# Patient Record
Sex: FEMALE | URBAN - METROPOLITAN AREA
[De-identification: names, ages, dates, MRNs, and addresses within clinical notes are randomized per-mention and may not be internally consistent; named-entity substitution may affect disease eponyms.]

---

## 2017-12-08 ENCOUNTER — ALLIED HEALTH/NURSE VISIT (OUTPATIENT)
Dept: NURSING | Facility: CLINIC | Age: 57
End: 2017-12-08

## 2017-12-08 VITALS
WEIGHT: 145 LBS | SYSTOLIC BLOOD PRESSURE: 166 MMHG | HEART RATE: 80 BPM | HEIGHT: 60 IN | DIASTOLIC BLOOD PRESSURE: 86 MMHG | BODY MASS INDEX: 28.47 KG/M2

## 2017-12-08 DIAGNOSIS — Z00.6 EXAMINATION OF PARTICIPANT IN CLINICAL TRIAL: Primary | ICD-10-CM

## 2017-12-08 DIAGNOSIS — I10 HYPERTENSION GOAL BP (BLOOD PRESSURE) < 140/90: ICD-10-CM

## 2017-12-08 PROCEDURE — 99207 ZZC NO CHARGE NURSE ONLY: CPT

## 2017-12-08 NOTE — PROGRESS NOTES
The following section is to be completed by [facility] Pharmacist:      PGEN study visit 1 : HTN Uncontrolled on 1 BP medication Class   Study location: [place]   12/8/2017     This research participant has responded to study outreach efforts and has expressed interest in PGEN study.  she has self reported that she satisfies the following inclusion and exclusion criteria:    Inclusion criteria:  1) New diagnosis of high blood pressure but not yet on blood pressure medication or   2) Existing hypertension not controlled with one CLASS of blood pressure medication and  3) Age between 30 and 80 and  4) BMI between 19-50      Today's bmi is Body mass index is 28.32 kg/(m^2).    Exclusion Criteria:    1) All patients taking more than 1 class of hypertensive medications  2) Heart disease  (Coronary artery disease)  3) Chronic kidney disease  4) Vascular disease  5) High blood pressure due to other underlying condition (i.e. Secondary HTN)    BP screening readings by automatic cuff were: 188/114 and 192/110    Is screening BP reading today above the following parameters? Yes  >140/90 for ages 30-59 and all patients with diabetes   >150/90 for age >60    Pt's readings in the lobby were high, although the diastolic on the second reading would be within in study parameters. We had her rest for 5-10 minutes and decided to recheck manually. She stated it has never been this high and was stressed when she first arrived.     Smoking: Occasionally, perhaps 2 per day  Alcohol consumption none  Exercise5-7 times/week; walks daily  Following a DASH diet currently ?Yes    Update  Medications, and Allergies: Done Pt not currently on any medication. Was on lisinopril in the past.      Current Outpatient Prescriptions on File Prior to Visit:  LISINOPRIL 5 MG PO TABS 1 TABLET TWICE DAILY   VENLAFAXINE  MG PO TB24 2 TABS ONCE DAY   BUPROPION  MG PO TB12 1 TABLET TWICE DAILY   SEROQUEL 100 MG PO TABS 1 TABLET TWICE DAILY  "  OMEPRAZOLE 20 MG PO CPDR 1 CAPSULE DAILY   TRAZODONE  MG PO TABS 1 TABLET 3 TIMES DAILY     No current facility-administered medications on file prior to visit.     Last Basic Metabolic Panel:  No results found for: NA   No results found for: POTASSIUM  No results found for: CHLORIDE  No results found for: MANDO  No results found for: CO2  No results found for: BUN  No results found for: CR  No results found for: GLC     A baseline potassium, creatinine, BUN, GFR has not been done within past 12 months (at [facility] or accessible via Care Everywhere)      Ht 5' (1.524 m)  Wt 145 lb (65.8 kg)  BMI 28.32 kg/m2      Is the manual confirmatory BP reading as recorded in the vitals section today also high? Yes  >140/90 for ages 30-59 and diabetics  >150/90 for age >60    Pt's initial manual BP in the room was 178/94. After another 5 minutes of rest, it came down to 166/86, which is more in line with the values the patient reports getting.    Please complete \"PGEN Hypertension Study\" flow sheet : Done    Which arm is this potential patient being considered for enrollment into ? HTN Uncontrolled on 1 BP medication Class     Patient has signed the consent form and HIPPA form: Done.     Cheek swabs (genetic profile test) was obtained per protocol and will be provided to clinic lab today : Done    Bar code associated with genetic test kit is : [id number]     Results will be available to the research team in 2-3 weeks and patient will be advised of the treatment plan, based on the randomization process, at that time (or at 4 weeks for patients in the uncontrolled HTN group)      Our research team will reach out to patient to schedule a follow up visit.    Patient was counseled regarding the lifestyle changes (listed below)  to help with BP management Done  Lifestyle changes that can help control high blood pressure:  Even though PGEN is a study to test effectiveness of genetically guided medications for managing high blood " pressure, there are several things you can do to ensure your blood pressure stays in good control:    Maintain a healthy weight (BMI<26). A modest amount of weight loss can be helpful    Limit salt intake to under 2400mg daily    Follow the DASH diet (lean meats, low salt, whole grains, lots of fruits/vegies)    Stay active, try to get in 30 minutes of exercise daily.    Manage your daily stress.    Do not smoke cigarettes (or cut back)    Limit alcohol (2 drinks/day for men, 1 drink/day for women)  Was AVS  provided to patient with the relevant <dot>PGENPI... dot phrase pulled into patient instructions: yes    Patient was given an opportunity to ask questions.  Patient verbalized understanding of this plan and is agreeable to continuing with this research study : yes    Moses HumphreyD      G provider notes and plan:  Based on the above and chart review the patient is noted to have:HTN Uncontrolled on 1 BP medication Class      Patient is suitable for enrollment into the PGEN study: yes    I have added the diagnosis of hypertension to the problem list with the appropriate JNC8 target blood pressure:  yes    Leslie Silverman MD

## 2017-12-08 NOTE — PATIENT INSTRUCTIONS
[facility] Hypertension Study   Visit 1     Thank you for your interest in the [facility] hypertension research study.    At the visit today we swabbed your cheeks to obtain a genetic test that will be used to guide your blood pressure treatment.      In the coming days you will be contacted by a research team member to schedule your next office visit.  After it is scheduled, be sure to let the research coordinator know as soon as possible if you cannot make it so that the visit can be rescheduled for you.     As a participant in the [facility] for Hypertension Study you will be asked to use a blood pressure cuff for the first 30 days of your study participation to see how your blood pressure is when you are off of your blood pressure medication. You are being asked to wear the blood pressure cuff to measure your blood pressure twice daily. Please ensure that measurements are taken in the morning after a five minute resting period and before exercising, eating, or consuming alcohol or caffeine products. Wait at least 30 minutes after showering before taking measurement.    You will be asked to enter these daily blood pressure measurements onto a paper log. At the end of each week, you will need to enter your blood pressure values into the online blood pressure log provided by [employer] via  email link. If you are completing the surveys on paper, please return the paper blood pressure log to your clinic during your second study visit.     **IMPORTANT** If during these 30 days you record a systolic reading of 170  or higher or a diastolic reading of 110 or higher for one of your two blood pressure readings, wait 2-3 minutes and take a third blood pressure measurement. We ask that you then call the [employer] 24/7 triage system at [phone] if you record two high blood pressure readings. In addition, please contact the [employer] triage system if you experience any symptoms that may be related to hypertension.     If you record blood  pressure at this level and also experience symptoms such  as chest pain, shortness of breath, numbness/weakness, change in vision or difficulty speaking call 911     Lifestyle changes that can help control high blood pressure:  Even though PGEN is a study to test effectiveness of genetically guided medications for managing high blood pressure, there are several things you can do to ensure your blood pressure stays in good control:    Maintain a healthy weight (BMI<26). A modest amount of weight loss can be helpful    Limit salt intake to under 2400mg daily    Follow the DASH diet (lean meats, low salt, whole grains, lots of fruits/vegies)    Stay active, try to get in 30 minutes of exercise daily.    Manage your daily stress.    Do not smoke cigarettes (or cut back)    Limit alcohol (2 drinks/day for men, 1 drink/day for women)     In four weeks, your hypertension medications will be prescribed via phone or through [employer].     If you have any questions or concerns about the study please contact the research coordinator at [phone]. If your phone number, email or address changes please alert the research coordinator.    In the meantime, we ask that you complete the online surveys emailed out to  you, if completing online, or mailed out to you, if completing paper surveys,  before your next visit.

## 2017-12-08 NOTE — MR AVS SNAPSHOT
After Visit Summary   12/8/2017    Amanda Chance    MRN: [id number]           Patient Information     Date Of Birth          1960        Visit Information        Provider Department      12/8/2017 1:30 PM Provider, [facility]        Today's Diagnoses     Examination of participant in clinical trial    -  1      Care Instructions    [facility] Hypertension Study   Visit 1     Thank you for your interest in the [facility] hypertension research study.    At the visit today we swabbed your cheeks to obtain a genetic test that will be used to guide your blood pressure treatment.      In the coming days you will be contacted by a research team member to schedule your next office visit.  After it is scheduled, be sure to let the research coordinator know as soon as possible if you cannot make it so that the visit can be rescheduled for you.     As a participant in the [facility] for Hypertension Study you will be asked to use a blood pressure cuff for the first 30 days of your study participation to see how your blood pressure is when you are off of your blood pressure medication. You are being asked to wear the blood pressure cuff to measure your blood pressure twice daily. Please ensure that measurements are taken in the morning after a five minute resting period and before exercising, eating, or consuming alcohol or caffeine products. Wait at least 30 minutes after showering before taking measurement.    You will be asked to enter these daily blood pressure measurements onto a paper log. At the end of each week, you will need to enter your blood pressure values into the online blood pressure log provided by [employer] via  email link. If you are completing the surveys on paper, please return the paper blood pressure log to your clinic during your second study visit.     **IMPORTANT** If during these 30 days you record a systolic reading of 170  or higher or a diastolic reading of 110 or higher  for one of your two blood pressure readings, wait 2-3 minutes and take a third blood pressure measurement. We ask that you then call the [employer] 24/7 triage system at [phone] if you record two high blood pressure readings. In addition, please contact the [employer] triage system if you experience any symptoms that may be related to hypertension.     If you record blood pressure at this level and also experience symptoms such  as chest pain, shortness of breath, numbness/weakness, change in vision or difficulty speaking call 911     Lifestyle changes that can help control high blood pressure:  Even though PGEN is a study to test effectiveness of genetically guided medications for managing high blood pressure, there are several things you can do to ensure your blood pressure stays in good control:    Maintain a healthy weight (BMI<26). A modest amount of weight loss can be helpful    Limit salt intake to under 2400mg daily    Follow the DASH diet (lean meats, low salt, whole grains, lots of fruits/vegies)    Stay active, try to get in 30 minutes of exercise daily.    Manage your daily stress.    Do not smoke cigarettes (or cut back)    Limit alcohol (2 drinks/day for men, 1 drink/day for women)     In four weeks, your hypertension medications will [facility] phone [facility].     If you have any questions or concerns about the study please contact the research coordinator at [phone]. If your phone number, email or address changes please alert the research coordinator.    In the meantime, we ask that you complete the online surveys emailed out to  you, if completing online, or mailed out to you, if completing paper surveys,  before your next visit.            Follow-ups after your visit        Follow-up notes from your care team     Return in about 4 weeks (around 1/5/2018).      Who to contact     If you have questions or need follow up information about today's clinic visit or your schedule  "please contact [facility] directly at [phone].  Normal or non-critical lab and imaging results will be communicated to you by MyChart, letter or phone within 4 business days after the clinic has received the results. If you do not hear from us within 7 days, please contact the clinic through MyChart or phone. If you have a critical or abnormal lab result, we will notify you by phone as soon as possible.  Submit refill requests through [employer] or call your pharmacy and they will forward the refill request to us. Please allow 3 business days for your refill to be completed.          Additional Information About Your Visit        [employer] Information     [employer] lets you send messages to your doctor, view your test results, renew your prescriptions, schedule appointments and more. To sign up, go to [email or web address] . Click on \"Log in\" on the left side of the screen, which will take you to the Welcome page. Then click on \"Sign up Now\" on the right side of the page.     You will be asked to enter the access code listed below, as well as some personal information. Please follow the directions to create your username and password.     Your access code is: [email or web address]  Expires: 3/8/2018  2:12 PM     Your access code will  in 90 days. If you need help or a new code, please call your [facility] clinic or [phone].        Care EveryWhere ID     This is your Care EveryWhere ID. This could be used by other organizations to access your [facility] medical records  [id number]        Your Vitals Were     Pulse Height BMI (Body Mass Index)             80 5' (1.524 m) 28.32 kg/m2          Blood Pressure from Last 3 Encounters:   17 166/86   08/02/10 206/124    Weight from Last 3 Encounters:   17 145 lb (65.8 kg)   08/02/10 192 lb (87.1 kg)              Today, you had the following     No orders found for display       Primary Care Provider    None Specified       No primary provider on " file.        Equal Access to Services     [facility]: Hadii cathleen patricia edgar Leone, wajesseeda luqcaprice, qafrankie mariumgarrettbarb jones, michael cain. So [facility] [phone].    ATENCIÓN: Si habla español, tiene a méndez disposición servicios gratuitos de asistencia lingüística. Llame al [phone].    We comply with applicable federal civil rights laws and [place] laws. We do not discriminate on the basis of race, color, national origin, age, disability, sex, sexual orientation, or gender identity.            Thank you!     Thank you for choosing [facility]  for your care. Our goal is always to provide you with excellent care. Hearing back from our patients is one way we can continue to improve our services. Please take a few minutes to complete the written survey that you may receive in the mail after your visit with us. Thank you!             Your Updated Medication List - Protect others around you: Learn how to safely use, store and throw away your medicines at www.disposemymeds.org.          This list is accurate as of: 12/8/17  2:12 PM.  Always use your most recent med list.                   Brand Name Dispense Instructions for use Diagnosis    buPROPion 150 MG 12 hr tablet    WELLBUTRIN SR     1 TABLET TWICE DAILY        lisinopril 5 MG tablet    PRINIVIL/ZESTRIL     1 TABLET TWICE DAILY        omeprazole 20 MG CR capsule    priLOSEC     1 CAPSULE DAILY        SEROQUEL 100 MG tablet   Generic drug:  QUEtiapine      1 TABLET TWICE DAILY        traZODone 150 MG tablet    DESYREL     1 TABLET 3 TIMES DAILY        venlafaxine 150 MG Tb24 24 hr tablet    EFFEXOR-ER     2 TABS ONCE DAY

## 2017-12-20 ENCOUNTER — TRANSFERRED RECORDS (OUTPATIENT)
Dept: HEALTH INFORMATION MANAGEMENT | Facility: CLINIC | Age: 57
End: 2017-12-20

## 2018-01-08 ENCOUNTER — TELEPHONE (OUTPATIENT)
Dept: FAMILY MEDICINE | Facility: CLINIC | Age: 58
End: 2018-01-08

## 2018-01-08 DIAGNOSIS — I10 HYPERTENSION GOAL BP (BLOOD PRESSURE) < 140/90: Primary | ICD-10-CM

## 2018-01-08 RX ORDER — CARVEDILOL 3.12 MG/1
3.12 TABLET ORAL 2 TIMES DAILY WITH MEALS
Qty: 60 TABLET | Refills: 1 | Status: SHIPPED | OUTPATIENT
Start: 2018-01-08

## 2018-01-08 NOTE — TELEPHONE ENCOUNTER
Patient was due to be prescribed medication on January 5th based on genetic recommendations. The preferred pharmacy is [facility] in [place].     As a reminder, please place the standing order in addition to prescribing medication.

## 2018-01-09 NOTE — TELEPHONE ENCOUNTER
Hi    Your patient in currently enrolled in the PGEN hypertension study being done at [facility] clinics.      She  has been randomized to the genetically guided group.  That result is in the media tab or is in the process of being scanned in.     Please do NOT share any of the genetic information with the patient.  Please also do NOT share which group she has been randomized to.    I have placed a standing order as well as the initial blood pressure medication as indicated by our study protocol. You can refer to today's orders for more details.       If you do NOT agree with the standing order, please route back to me with the changes you would like to see and I can then modify the order to reflect your adjustment based on clinical judgement.     More details about this study can be found by going to [email or web address].  Or typing <dot>[email or web address]>  in Epic     Study team:   Please reach out and advise prescription for coreg 3.125 mg BID was ordered.    Please advise patient of these common side effects potentially associated with this prescription : dizziness/fatigue.    Pharmacist will also provide more medication related education.    Please also arrange follow-up per protocol.     Suze Sparks PA-C covering for Leslie Silverman MD  PGEN study

## 2018-02-28 NOTE — TELEPHONE ENCOUNTER
Multiple unsuccessful attempts have been made by the research team to contact the patient since this encounter. The patient has not gotten back in touch with the study team.  A letter was mailed to the patient on February 16th instructing her to contact the study team within one week or she would be withdrawn from the study. No contact was made.    The patient has been withdrawn from the PGen study.

## 2019-01-23 DIAGNOSIS — I10 HYPERTENSION GOAL BP (BLOOD PRESSURE) < 140/90: ICD-10-CM

## 2019-01-23 NOTE — TELEPHONE ENCOUNTER
"Requested Prescriptions   Pending Prescriptions Disp Refills     carvedilol (COREG) 3.125 MG tablet [Pharmacy Med Name: Carvedilol Oral Tablet 3.125 MG] 60 tablet 0    Last Written Prescription Date:  1/8/18  Last Fill Quantity: 60,  # refills: 1   Last office visit: 8/2/2010 with prescribing provider:  nieves Srivastava Office Visit:     Sig: TAKE 1 TABLET (3.125 MG) BY MOUTH 2 TIMES DAILY (WITH MEALS)    Beta-Blockers Protocol Failed - 1/23/2019 12:53 PM       Failed - Blood pressure under 140/90 in past 12 months    BP Readings from Last 3 Encounters:   12/08/17 166/86   08/02/10 206/124                Failed - Recent (12 mo) or future (30 days) visit within the authorizing provider's specialty    Patient had office visit in the last 12 months or has a visit in the next 30 days with authorizing provider or within the authorizing provider's specialty.  See \"Patient Info\" tab in inbasket, or \"Choose Columns\" in Meds & Orders section of the refill encounter.             Passed - Patient is age 6 or older       Passed - Medication is active on med list          "

## 2019-01-24 RX ORDER — CARVEDILOL 3.12 MG/1
3.12 TABLET ORAL 2 TIMES DAILY WITH MEALS
Refills: 0
Start: 2019-01-24